# Patient Record
Sex: MALE | Race: ASIAN | NOT HISPANIC OR LATINO | Employment: STUDENT | URBAN - NONMETROPOLITAN AREA
[De-identification: names, ages, dates, MRNs, and addresses within clinical notes are randomized per-mention and may not be internally consistent; named-entity substitution may affect disease eponyms.]

---

## 2021-07-20 ENCOUNTER — APPOINTMENT (OUTPATIENT)
Dept: GENERAL RADIOLOGY | Facility: HOSPITAL | Age: 26
End: 2021-07-20

## 2021-07-20 ENCOUNTER — HOSPITAL ENCOUNTER (EMERGENCY)
Facility: HOSPITAL | Age: 26
Discharge: HOME | End: 2021-07-20
Attending: EMERGENCY MEDICINE | Admitting: EMERGENCY MEDICINE

## 2021-07-20 VITALS
OXYGEN SATURATION: 96 % | DIASTOLIC BLOOD PRESSURE: 65 MMHG | TEMPERATURE: 98.4 F | HEART RATE: 81 BPM | HEIGHT: 69 IN | WEIGHT: 171 LBS | BODY MASS INDEX: 25.33 KG/M2 | SYSTOLIC BLOOD PRESSURE: 126 MMHG | RESPIRATION RATE: 18 BRPM

## 2021-07-20 DIAGNOSIS — U07.1 COVID-19: Primary | ICD-10-CM

## 2021-07-20 PROCEDURE — 71045 X-RAY EXAM CHEST 1 VIEW: CPT

## 2021-07-20 PROCEDURE — 99283 EMERGENCY DEPT VISIT LOW MDM: CPT

## 2021-07-20 RX ORDER — GUAIFENESIN AND CODEINE PHOSPHATE 100; 10 MG/5ML; MG/5ML
5 SOLUTION ORAL 4 TIMES DAILY PRN
Qty: 150 ML | Refills: 0 | Status: SHIPPED | OUTPATIENT
Start: 2021-07-20

## 2021-07-20 RX ORDER — IBUPROFEN 600 MG/1
600 TABLET ORAL ONCE
Status: COMPLETED | OUTPATIENT
Start: 2021-07-20 | End: 2021-07-20

## 2021-07-20 RX ORDER — DEXAMETHASONE 6 MG/1
6 TABLET ORAL
Qty: 5 TABLET | Refills: 0 | Status: SHIPPED | OUTPATIENT
Start: 2021-07-20

## 2021-07-20 RX ORDER — IBUPROFEN 800 MG/1
800 TABLET ORAL ONCE
Status: DISCONTINUED | OUTPATIENT
Start: 2021-07-20 | End: 2021-07-20

## 2021-07-20 RX ORDER — ONDANSETRON 4 MG/1
4 TABLET, ORALLY DISINTEGRATING ORAL EVERY 8 HOURS PRN
Qty: 12 TABLET | Refills: 0 | Status: SHIPPED | OUTPATIENT
Start: 2021-07-20

## 2021-07-20 RX ADMIN — IBUPROFEN 600 MG: 600 TABLET ORAL at 08:13

## 2021-07-20 NOTE — ED PROVIDER NOTES
Subjective   History of Present Illness    Chief Complaint: Fever cough  History of Present Illness: 25-year-old male with day 11 of Covid symptoms, here with persistent fever and cough.  No relief with over-the-counter medications.  No shortness of breath no hemoptysis no syncope.  Onset: Ongoing issue  Duration: Persistent  Exacerbating / Alleviating factors: No improvement with over-the-counter cough medicines and antipyretics  Associated symptoms: None      Nurses Notes reviewed and agree, including vitals, allergies, social history and prior medical history.     REVIEW OF SYSTEMS: All systems reviewed and not pertinent unless noted.    Positive for: Dry cough, fever    Negative for: Hemoptysis or syncope chest pain palpitations abdominal pain back pain urinary symptoms diarrhea rash  Review of Systems    History reviewed. No pertinent past medical history.    No Known Allergies    History reviewed. No pertinent surgical history.    History reviewed. No pertinent family history.    Social History     Socioeconomic History   • Marital status: Single     Spouse name: Not on file   • Number of children: Not on file   • Years of education: Not on file   • Highest education level: Not on file   Tobacco Use   • Smoking status: Never Smoker   Substance and Sexual Activity   • Alcohol use: Never   • Drug use: Never   • Sexual activity: Defer           Objective   Physical Exam    CONSTITUTIONAL: Well developed, nontoxic 25-year-old  male,  in no acute distress.  VITAL SIGNS: per nursing, reviewed and noted  SKIN: exposed skin with no rashes, ulcerations or petechiae.  EYES: perrla. EOMI.  ENT: Normal voice.  Patient maintained wearing a mask throughout patient encounter due to coronavirus pandemic  RESPIRATORY:  No increased work of breathing. No retractions.  Chest clear to auscultation.  Persistent dry cough.  CARDIOVASCULAR:  regular rate and rhythm, no murmurs.  Good Peripheral pulses. Good cap refill to  extremities.   GI: Abdomen soft, nontender, normal bowel sounds. No hernia. No ascites.  MUSCULOSKELETAL:  No tenderness. Full ROM. Strength and tone grossly normal.  no spasms. no neck or back tenderness or spasm.   NEUROLOGIC: Alert, oriented x 3. No gross deficits. GCS 15.   PSYCH: appropriate affect.  : no bladder tenderness or distention, no CVA tenderness      Procedures     No attending physician procedures were performed on this patient.      ED Course  ED Course as of Jul 20 1101   Tue Jul 20, 2021   0940 HISTORY: covid positive, cough     COMPARISON: None.     FINDINGS: The heart is normal in size. The mediastinum is unremarkable.  There are left greater than right basilar opacities either atelectasis  or pneumonia. No pleural effusions are evident. There is no  pneumothorax.  There are no acute osseous abnormalities.     IMPRESSION:  Left greater than right basilar opacities which may reflect  atelectasis or pneumonia.     Continued followup is recommended.     This report was finalized on 7/20/2021 9:06 AM by Oleksandr Vanessa M.D..    [PF]      ED Course User Index  [PF] Louis Paige, DO                                           MDM  25-year-old male presents with persistent Covid symptoms day 11.  Arrived febrile 102.9 °F.  Normotensive.  Normal room air sats 97%.  Chest x-ray reveals atelectasis versus pneumonia.  Patient denies shortness of breath, will treat with corticosteroids, Cheratussin AC, Zofran.  Continue over-the-counter antipyretics, advised continue quarantine until afebrile for 24 hours without symptoms requiring antipyretics.  No indications for antibiotics.  Final diagnoses:   COVID-19       ED Disposition  ED Disposition     ED Disposition Condition Comment    Discharge Stable           PATIENT [facility] [address]  [phone]  Call   if you do not have a primary care provider, for follow up.    [facility] Emergency Department  [address]  [facility] [id number] [phone]    As needed, If symptoms worsen         Medication List      New Prescriptions    dexamethasone 6 MG tablet  Commonly known as: DECADRON  Take 1 tablet by mouth Daily With Breakfast.     guaiFENesin-codeine 100-10 MG/5ML liquid  Commonly known as: GUAIFENESIN AC  Take 5 mL by mouth 4 (Four) Times a Day As Needed for Cough.     ondansetron ODT 4 MG disintegrating tablet  Commonly known as: ZOFRAN-ODT  Place 1 tablet on the tongue Every 8 (Eight) Hours As Needed for Nausea.           Where to Get Your Medications      These medications were sent to [facility] [address] [place] [address] [address] [place], [place] [address]. - [phone]  - [phone] [address] [address]    Phone: [phone]   · dexamethasone 6 MG tablet  · guaiFENesin-codeine 100-10 MG/5ML liquid  · ondansetron ODT 4 MG disintegrating tablet          Louis Paige,   07/20/21 1101

## 2021-07-20 NOTE — DISCHARGE INSTRUCTIONS
The CDC recommends that you can come out of quarantine as long as your symptoms are improving and you are afebrile for 24 hours without antipyretic medications after your 10-day minimum  quarantine.  You can check with the local health department or check with the CDC website for further information

## 2021-07-21 ENCOUNTER — PATIENT OUTREACH (OUTPATIENT)
Dept: CASE MANAGEMENT | Facility: OTHER | Age: 26
End: 2021-07-21

## 2021-07-21 NOTE — OUTREACH NOTE
Ambulatory Case Management Note    ED Potential Covid Discharge Follow-up    RN-ACM outreach to patient.  Patient had an ED visit at [facility] 07/20/21.  Clinical impression noted as COVID-19.  Patient was treated and discharged to home to follow with a local provider.  Medication changes at discharge include the addition of dexamethasone, quaifensesin-codeine, and ondansetron.    Patient reported moderate symptom improvement with medications.     Care Evaluation    Questions/Answers      Most Recent Value   Suggested Appointments  -- [Patient Connection contact information reviewed.  Patient declined scheduling assistance. ]   Other Patient Education/Resources   24/7 [facility] Nurse Call Line, Where to Go [AVS, education, recommended quarantine, and f/u reviewed.  Patient denied unmet needs/questions/concerns.]   Medication Adherence  Medications understood [Patient reported having obtained discharge medications and denies questions relative to their use. ]   Healthy Lifestyle (Self-Efficacy)  recognizes when to contact medical assistance, self-reports important symptoms to medical professional                  Mona Pina RN  Ambulatory Case Management    7/21/2021, 15:10 EDT

## 2022-12-31 ENCOUNTER — HOSPITAL ENCOUNTER (EMERGENCY)
Facility: HOSPITAL | Age: 27
Discharge: HOME | End: 2022-12-31
Attending: EMERGENCY MEDICINE | Admitting: EMERGENCY MEDICINE
Payer: MEDICAID

## 2022-12-31 VITALS
OXYGEN SATURATION: 98 % | WEIGHT: 170 LBS | RESPIRATION RATE: 18 BRPM | BODY MASS INDEX: 25.18 KG/M2 | DIASTOLIC BLOOD PRESSURE: 88 MMHG | SYSTOLIC BLOOD PRESSURE: 142 MMHG | HEART RATE: 116 BPM | TEMPERATURE: 98.7 F | HEIGHT: 69 IN

## 2022-12-31 DIAGNOSIS — B97.89 VIRAL RESPIRATORY ILLNESS: ICD-10-CM

## 2022-12-31 DIAGNOSIS — J02.9 VIRAL PHARYNGITIS: Primary | ICD-10-CM

## 2022-12-31 DIAGNOSIS — J98.8 VIRAL RESPIRATORY ILLNESS: ICD-10-CM

## 2022-12-31 LAB
FLUAV RNA RESP QL NAA+PROBE: NOT DETECTED
FLUBV RNA RESP QL NAA+PROBE: NOT DETECTED
S PYO AG THROAT QL: NEGATIVE
SARS-COV-2 RNA RESP QL NAA+PROBE: NOT DETECTED

## 2022-12-31 PROCEDURE — 87880 STREP A ASSAY W/OPTIC: CPT | Performed by: PHYSICIAN ASSISTANT

## 2022-12-31 PROCEDURE — 87081 CULTURE SCREEN ONLY: CPT | Performed by: PHYSICIAN ASSISTANT

## 2022-12-31 PROCEDURE — C9803 HOPD COVID-19 SPEC COLLECT: HCPCS

## 2022-12-31 PROCEDURE — 87636 SARSCOV2 & INF A&B AMP PRB: CPT | Performed by: PHYSICIAN ASSISTANT

## 2022-12-31 PROCEDURE — 99283 EMERGENCY DEPT VISIT LOW MDM: CPT

## 2022-12-31 RX ORDER — GUAIFENESIN AND DEXTROMETHORPHAN HYDROBROMIDE 600; 30 MG/1; MG/1
2 TABLET, EXTENDED RELEASE ORAL EVERY 12 HOURS SCHEDULED
Qty: 30 TABLET | Refills: 0 | Status: SHIPPED | OUTPATIENT
Start: 2022-12-31

## 2022-12-31 RX ORDER — IBUPROFEN 800 MG/1
800 TABLET ORAL ONCE
Status: COMPLETED | OUTPATIENT
Start: 2022-12-31 | End: 2022-12-31

## 2022-12-31 RX ADMIN — IBUPROFEN 800 MG: 800 TABLET, FILM COATED ORAL at 17:54

## 2022-12-31 RX ADMIN — TOPICAL ANESTHETIC 1 SPRAY: 200 SPRAY DENTAL; PERIODONTAL at 17:54

## 2022-12-31 NOTE — DISCHARGE INSTRUCTIONS
Take Tylenol and Motrin as needed per directions on the package.  Use over-the-counter Chloraseptic and lozenges for throat pain.  Gargle salt water as well to help with throat pain.  Drink plenty of fluids.  Continue using Flonase per directions on the package.  You can also take over-the-counter antihistamines including Benadryl or Claritin/Zyrtec per directions on the package.  Follow-up with your PCP for further outpatient evaluation if symptoms persist.  Return to the ER for new or worsening symptoms or acute concerns.

## 2022-12-31 NOTE — ED PROVIDER NOTES
Subjective   History of Present Illness   Patient is a 27-year-old male with no significant medical history presenting to the ER with complaints of sore throat, congestion, body aches, flulike symptoms that started about a week ago.  He states he has been using Flonase and Tylenol.  Denies any additional over-the-counter medications prior to arrival.  Denies additional symptoms or complaints at this time.  Denies any known recent sick contacts.    Review of Systems   HENT: Positive for congestion and sore throat.    Respiratory: Positive for cough.    Musculoskeletal: Positive for myalgias.   All other systems reviewed and are negative.      History reviewed. No pertinent past medical history.    No Known Allergies    History reviewed. No pertinent surgical history.    History reviewed. No pertinent family history.    Social History     Socioeconomic History   • Marital status: Single   Tobacco Use   • Smoking status: Never   Substance and Sexual Activity   • Alcohol use: Never   • Drug use: Never   • Sexual activity: Defer           Objective   Physical Exam  Vitals and nursing note reviewed.   Constitutional:       General: He is not in acute distress.     Appearance: He is not toxic-appearing.   HENT:      Head: Normocephalic and atraumatic.      Right Ear: Tympanic membrane and ear canal normal.      Left Ear: Tympanic membrane and ear canal normal.      Mouth/Throat:      Mouth: Mucous membranes are moist. No oral lesions.      Pharynx: Uvula midline. Posterior oropharyngeal erythema present. No oropharyngeal exudate.      Tonsils: No tonsillar exudate or tonsillar abscesses.   Eyes:      Conjunctiva/sclera: Conjunctivae normal.   Cardiovascular:      Rate and Rhythm: Tachycardia present.      Heart sounds: Normal heart sounds.   Pulmonary:      Effort: Pulmonary effort is normal.      Breath sounds: Normal breath sounds.   Abdominal:      Palpations: Abdomen is soft.   Musculoskeletal:      Cervical back:  Normal range of motion.   Skin:     General: Skin is warm and dry.   Neurological:      General: No focal deficit present.      Mental Status: He is alert and oriented to person, place, and time.   Psychiatric:         Mood and Affect: Mood normal.         Behavior: Behavior normal.         Procedures           ED Course      Lab Results (last 24 hours)     Procedure Component Value Units Date/Time    COVID-19 and FLU A/B PCR - Swab, Nasopharynx [[id number]]  (Normal) Collected: 12/31/22 1719    Specimen: Swab from Nasopharynx Updated: 12/31/22 1746     COVID19 Not Detected     Influenza A PCR Not Detected     Influenza B PCR Not Detected    Narrative:      Fact sheet for providers: https://www.fda.gov/media/285508/download    Fact sheet for patients: https://www.fda.gov/media/407518/download    Test performed by PCR.    Rapid Strep A Screen - Swab, Throat [[id number]]  (Normal) Collected: 12/31/22 1719    Specimen: Swab from Throat Updated: 12/31/22 1736     Strep A Ag Negative    Beta Strep Culture, Throat - Swab, Throat [[id number]] Collected: 12/31/22 1719    Specimen: Swab from Throat Updated: 12/31/22 1736             /88   Pulse 116   Temp 98.7 °F (37.1 °C)   Resp 18   Ht 175.3 cm (69\")   Wt 77.1 kg (170 lb)   SpO2 98%   BMI 25.10 kg/m²                                   MDM   Patient was evaluated in the ER for sore throat and flulike symptoms times a week.  Patient is afebrile and hemodynamically stable, nontoxic-appearing on exam.  Differential diagnosis includes but is not limited to bacterial pharyngitis, viral pharyngitis, viral upper respiratory infection, seasonal allergies, among others.  No signs of peritonsillar abscess or exudates to tonsils.  Strep was negative.  COVID and flu are negative.  Patient was given benzocaine spray and Motrin in the ER.  Prescription was sent for Mucinex DM.  Patient advised on supportive care with over-the-counter lozenges, Chloraseptic, Tylenol, antihistamines.   Advised to continue using Flonase.  Patient agreeable with plan for discharge.  Advised follow-up with PCP for further outpatient evaluation if symptoms persist.  Precautions were given for return to the ER for any new or worsening symptoms.    Final diagnoses:   Viral pharyngitis   Viral respiratory illness       ED Disposition  ED Disposition     ED Disposition   Discharge    Condition   Stable    Comment   --             Provider, No Known  [facility] [address]  [phone]    Schedule an appointment as soon as possible for a visit   for further outpatient evaluation if symptoms persist    [facility] Emergency Department  [address] [facility] [address] [phone]  Go to   As needed, If symptoms worsen         Medication List      New Prescriptions    guaifenesin-dextromethorphan  MG tablet sustained-release 12 hour tablet  Take 2 tablets by mouth Every 12 (Twelve) Hours.           Where to Get Your Medications      These medications were sent to [facility] [id number] - [place], [place] - [address] AT [facility] [phone]  - [phone] [facility] [address], [facility] [address]    Phone: [phone]   · guaifenesin-dextromethorphan  MG tablet sustained-release 12 hour tablet          Ashia Vyas PA-C  12/31/22 3787

## 2023-01-03 LAB — BACTERIA SPEC AEROBE CULT: NORMAL
